# Patient Record
(demographics unavailable — no encounter records)

---

## 2024-12-02 NOTE — PHYSICAL EXAM
[Right] : right elbow [] : tenderness over olecranon [NL (150)] : flexion 150 degrees [NL (0)] : extension 0 degrees [5___] : flexion 5[unfilled]/5

## 2024-12-02 NOTE — PROCEDURE
[Medium Joint Injection] : medium joint injection [Right] : of the right [Elbow Joint] : elbow joint [Pain] : pain [Inflammation] : inflammation [Betadine] : betadine [___ cc    1%] : Lidocaine ~Vcc of 1%  [Other: ___] : [unfilled] [GS] : GS [Crystals] : crystals [Previous OTC use and PT nontherapeutic] : patient has tried OTC's including aspirin, Ibuprofen, Aleve, etc or prescription NSAIDS, and/or exercises at home and/or physical therapy without satisfactory response [Patient had decreased mobility in the joint] : patient had decreased mobility in the joint [Risks, benefits, alternatives discussed / Verbal consent obtained] : the risks benefits, and alternatives have been discussed, and verbal consent was obtained

## 2024-12-02 NOTE — HISTORY OF PRESENT ILLNESS
[Gradual] : gradual [0] : 0 [Dull/Aching] : dull/aching [Intermittent] : intermittent [Rest] : rest [Meds] : meds [] : yes